# Patient Record
Sex: FEMALE | Race: WHITE
[De-identification: names, ages, dates, MRNs, and addresses within clinical notes are randomized per-mention and may not be internally consistent; named-entity substitution may affect disease eponyms.]

---

## 2018-03-22 ENCOUNTER — HOSPITAL ENCOUNTER (OUTPATIENT)
Dept: HOSPITAL 11 - JP.SDS | Age: 53
Discharge: HOME | End: 2018-03-22
Attending: SURGERY
Payer: MEDICAID

## 2018-03-22 VITALS — DIASTOLIC BLOOD PRESSURE: 72 MMHG | SYSTOLIC BLOOD PRESSURE: 132 MMHG

## 2018-03-22 DIAGNOSIS — Z91.041: ICD-10-CM

## 2018-03-22 DIAGNOSIS — Z79.4: ICD-10-CM

## 2018-03-22 DIAGNOSIS — E10.9: ICD-10-CM

## 2018-03-22 DIAGNOSIS — Z88.8: ICD-10-CM

## 2018-03-22 DIAGNOSIS — Z88.2: ICD-10-CM

## 2018-03-22 DIAGNOSIS — Z88.0: ICD-10-CM

## 2018-03-22 DIAGNOSIS — K63.5: ICD-10-CM

## 2018-03-22 DIAGNOSIS — Z12.11: Primary | ICD-10-CM

## 2018-03-22 DIAGNOSIS — E03.9: ICD-10-CM

## 2018-03-22 DIAGNOSIS — K62.1: ICD-10-CM

## 2018-03-22 PROCEDURE — 45380 COLONOSCOPY AND BIOPSY: CPT

## 2018-03-22 NOTE — OR
DATE OF PROCEDURE:  03/22/2018

 

PROCEDURES:  Colonoscopy.

 

FINDINGS:

1. Sigmoid polyps, approximately 5 mm, completely removed using cold biopsy forceps.

2. Rectal polyps, approximately 5 mm, completely removed using cold biopsy forceps.

 

COMPLICATIONS:  None.

 

ASSISTANT:  None.

 

PREOPERATIVE DIAGNOSIS:  Screening colonoscopy.

 

POSTOPERATIVE DIAGNOSIS:  Screening colonoscopy.

 

RISKS:  Risks, benefits, alternatives, and limitations including, but not limited to

infection, bleeding, and perforation were explained to the patient, and they wish to

proceed.

 

PROCEDURE IN DETAIL:  The patient was placed in left lateral decubitus position.  Digital

rectal exam was performed without abnormality.  Scope was introduced and advanced

atraumatically to the ileocecal valve.  A photo was taken.  The scope was brought back

through the ascending, transverse, descending colon, and retroflexed.

 

The polyps were most consistent with small hyperplastic polyps/benign lesions, nonetheless,

they were removed.  The patient tolerated the procedure well.

 

 

 

 

Raffi Mendoza MD

DD:  03/22/2018 08:08:31

DT:  03/22/2018 08:41:17

Job #:  [id number]

## 2018-03-23 NOTE — PN
DATE OF SERVICE:  03/23/2018

 

The patient is noted to be satisfactory for the procedure.

 

 

 

 

Raffi Mendoza MD

DD:  03/23/2018 11:36:34

DT:  03/23/2018 13:09:14

Job #:  [id number]